# Patient Record
Sex: FEMALE | Race: WHITE | URBAN - METROPOLITAN AREA
[De-identification: names, ages, dates, MRNs, and addresses within clinical notes are randomized per-mention and may not be internally consistent; named-entity substitution may affect disease eponyms.]

---

## 2017-08-03 ENCOUNTER — IMPORTED ENCOUNTER (OUTPATIENT)
Dept: URBAN - METROPOLITAN AREA CLINIC 50 | Facility: CLINIC | Age: 62
End: 2017-08-03

## 2017-12-07 ENCOUNTER — IMPORTED ENCOUNTER (OUTPATIENT)
Dept: URBAN - METROPOLITAN AREA CLINIC 50 | Facility: CLINIC | Age: 62
End: 2017-12-07

## 2017-12-27 ENCOUNTER — IMPORTED ENCOUNTER (OUTPATIENT)
Dept: URBAN - METROPOLITAN AREA CLINIC 50 | Facility: CLINIC | Age: 62
End: 2017-12-27

## 2017-12-27 NOTE — PATIENT DISCUSSION
"""Dr. Paralee Baumgarten to review test results with patient."" ""Reassured patient that intraocular pressures (IOPs) are at target levels and other ocular findings are stable. Continue present management and/or medication(s).  Follow up as directed. """

## 2018-05-16 ENCOUNTER — IMPORTED ENCOUNTER (OUTPATIENT)
Dept: URBAN - METROPOLITAN AREA CLINIC 50 | Facility: CLINIC | Age: 63
End: 2018-05-16

## 2018-12-05 ENCOUNTER — IMPORTED ENCOUNTER (OUTPATIENT)
Dept: URBAN - METROPOLITAN AREA CLINIC 50 | Facility: CLINIC | Age: 63
End: 2018-12-05

## 2018-12-05 NOTE — PATIENT DISCUSSION
"""Dr Yareli Coffey to review results with patient today"" ""Reassured patient that intraocular pressures (IOPs) are at target levels and other ocular findings are stable. Continue present management and/or medication(s).  Follow up as directed. """

## 2018-12-27 ENCOUNTER — IMPORTED ENCOUNTER (OUTPATIENT)
Dept: URBAN - METROPOLITAN AREA CLINIC 50 | Facility: CLINIC | Age: 63
End: 2018-12-27

## 2019-06-05 ENCOUNTER — IMPORTED ENCOUNTER (OUTPATIENT)
Dept: URBAN - METROPOLITAN AREA CLINIC 50 | Facility: CLINIC | Age: 64
End: 2019-06-05

## 2019-10-14 NOTE — PATIENT DISCUSSION
PT TO TRIAL MV CORRECTION: LEANING TOWARDS RLE &gt; LASIK 2' UNDERSTANDS CATARACTS WILL CONTINUE TO CHANGE RX.

## 2019-10-14 NOTE — PATIENT DISCUSSION
CATARACTS, OU- NOT VISUALLY SIGNIFICANT. DISC OPT OF GLS/[id number]-REFRACTIVE SX TO REDUCE DEPENDENCY ON GLS.

## 2019-10-14 NOTE — PATIENT DISCUSSION
DISC ALL SACRIFICES WITH MONOVA, MULTIFOCAL, AND EDOF LENSES IF PT DESIRES RLE SX. IT WAS EXPLAINED TO THE PT THAT CORRECTING FOR DVA OU WOULD REQUIRE PT TO WEAR GLASSES FOR ALL NVA/INT ACTIVITIES AND CORRECTING PT FOR NVA OU WOULD REQUIRE WEARING GLASSES FOR ALL DISTANCE/INT ACTIVITIES.

## 2019-10-21 NOTE — PATIENT DISCUSSION
MV TRIAL UNSUCCESSFUL. INTERESTED IN PERMANENT CORRECTION &gt; LASIK.  THEREFORE, PT TO SEE COUNSELOR TO DISC JOSE LUIS RUBIN.

## 2019-11-04 NOTE — PATIENT DISCUSSION
PANOPTIX IOL DISCUSSED WITH THE PATIENT. THE PATIENT UNDERSTANDS THAT THE COMPROMISES AND VISUAL SYMPTOMS FROM PATIENT TO PATIENT IS VARIABLE. IT IS IMPOSSIBLE TO PREDICT THE OUTCOME OF THE LENS BEING OFFERED. PANOPTIX TRIFOCAL IOL IS DESIGNED TO DECREASE DEPENDENCY ON GLASSES FOR NEAR, INTERMEDIATE AND DISTANCE. COMPROMISES INCLUDE DECREASED QUALITY OF VISION, DEPTH OF FOCUS AND CONTRAST SENSITIVITY. VISUAL DISTURBANCES INCLUDE STARBURSTS, RINGS AND HALOS USUALLY NOTICED AROUND POINT SOURCES OF LIGHT AT NIGHT.

## 2019-11-04 NOTE — PATIENT DISCUSSION
DRY EYE SYNDROME OU - LUBRICATE 4-6 TIMES DAILY WITH REFRESH OR SYSTANE IN ADDITION TO LOTEMAX 2 TIMES A DAY.

## 2019-11-04 NOTE — PATIENT DISCUSSION
REFRACTIVE ERROR OU - THE PATIENT DESIRES A REFRACTIVE LENS EXCHANGE. MONOVISION CONTACT LENS TRIAL WENT WELL ALTHOUGH THE PATIENT PREFERS PANOPTIX TO BE LESS DEPENDENT ON GLASSES.

## 2019-11-13 ENCOUNTER — IMPORTED ENCOUNTER (OUTPATIENT)
Dept: URBAN - METROPOLITAN AREA CLINIC 50 | Facility: CLINIC | Age: 64
End: 2019-11-13

## 2019-11-13 NOTE — PATIENT DISCUSSION
"""Reassured patient that intraocular pressures (IOPs) are at target levels and other ocular findings are stable. Continue present management and/or medication(s).  Follow up as directed. "" ""Continue Lumigan both eyes at bedtime ."""

## 2019-11-20 NOTE — PATIENT DISCUSSION
Continue: Lotemax (loteprednol etabonate): drops,suspension: 0.5% 1 drop three times a day into both eyes 11-

## 2020-05-19 ENCOUNTER — IMPORTED ENCOUNTER (OUTPATIENT)
Dept: URBAN - METROPOLITAN AREA CLINIC 50 | Facility: CLINIC | Age: 65
End: 2020-05-19

## 2020-05-19 PROBLEM — INACTIVE: Status: INACTIVE | Noted: 2020-05-19

## 2020-05-19 PROBLEM — Z98.890: Noted: 2020-05-19

## 2020-05-19 NOTE — PATIENT DISCUSSION
"""excision of lesion ERIS done today with patient consent "" ""Start Erythromycin Ointment left eye twice a day . """

## 2020-05-19 NOTE — PATIENT DISCUSSION
"""Cataract(s) are not visually significant for cataract surgery.  Monitor by regular examinations."""

## 2020-11-10 ENCOUNTER — IMPORTED ENCOUNTER (OUTPATIENT)
Dept: URBAN - METROPOLITAN AREA CLINIC 50 | Facility: CLINIC | Age: 65
End: 2020-11-10

## 2021-03-09 ENCOUNTER — IMPORTED ENCOUNTER (OUTPATIENT)
Dept: URBAN - METROPOLITAN AREA CLINIC 50 | Facility: CLINIC | Age: 66
End: 2021-03-09

## 2021-03-16 ENCOUNTER — IMPORTED ENCOUNTER (OUTPATIENT)
Dept: URBAN - METROPOLITAN AREA CLINIC 50 | Facility: CLINIC | Age: 66
End: 2021-03-16

## 2021-03-18 ENCOUNTER — IMPORTED ENCOUNTER (OUTPATIENT)
Dept: URBAN - METROPOLITAN AREA CLINIC 50 | Facility: CLINIC | Age: 66
End: 2021-03-18

## 2021-03-23 ENCOUNTER — IMPORTED ENCOUNTER (OUTPATIENT)
Dept: URBAN - METROPOLITAN AREA CLINIC 50 | Facility: CLINIC | Age: 66
End: 2021-03-23

## 2021-03-30 ENCOUNTER — IMPORTED ENCOUNTER (OUTPATIENT)
Dept: URBAN - METROPOLITAN AREA CLINIC 50 | Facility: CLINIC | Age: 66
End: 2021-03-30

## 2021-04-13 ENCOUNTER — IMPORTED ENCOUNTER (OUTPATIENT)
Dept: URBAN - METROPOLITAN AREA CLINIC 50 | Facility: CLINIC | Age: 66
End: 2021-04-13

## 2021-04-17 ASSESSMENT — PACHYMETRY
OS_CT_UM: 527
OD_CT_UM: 529
OD_CT_UM: 529
OS_CT_UM: 527
OD_CT_UM: 529
OS_CT_UM: 527
OD_CT_UM: 529
OS_CT_UM: 527
OD_CT_UM: 529
OD_CT_UM: 529
OS_CT_UM: 527
OD_CT_UM: 529
OS_CT_UM: 527
OS_CT_UM: 527
OD_CT_UM: 529
OS_CT_UM: 527
OD_CT_UM: 529
OS_CT_UM: 527
OD_CT_UM: 529
OS_CT_UM: 527

## 2021-04-17 ASSESSMENT — TONOMETRY
OD_IOP_MMHG: 15
OD_IOP_MMHG: 16
OS_IOP_MMHG: 17
OS_IOP_MMHG: 18
OS_IOP_MMHG: 17
OS_IOP_MMHG: 18
OD_IOP_MMHG: 18
OS_IOP_MMHG: 13
OS_IOP_MMHG: 14
OS_IOP_MMHG: 15
OD_IOP_MMHG: 15
OD_IOP_MMHG: 21
OD_IOP_MMHG: 14
OS_IOP_MMHG: 17
OD_IOP_MMHG: 17
OD_IOP_MMHG: 19
OS_IOP_MMHG: 18
OD_IOP_MMHG: 17
OD_IOP_MMHG: 17
OS_IOP_MMHG: 14
OD_IOP_MMHG: 14
OS_IOP_MMHG: 19
OD_IOP_MMHG: 18
OS_IOP_MMHG: 17
OS_IOP_MMHG: 17
OD_IOP_MMHG: 18
OD_IOP_MMHG: 15
OS_IOP_MMHG: 15
OD_IOP_MMHG: 19
OS_IOP_MMHG: 16
OD_IOP_MMHG: 18
OS_IOP_MMHG: 20
OS_IOP_MMHG: 23
OD_IOP_MMHG: 17
OS_IOP_MMHG: 14
OS_IOP_MMHG: 21
OS_IOP_MMHG: 18
OS_IOP_MMHG: 16
OS_IOP_MMHG: 19
OD_IOP_MMHG: 17
OS_IOP_MMHG: 20
OD_IOP_MMHG: 16
OS_IOP_MMHG: 22
OS_IOP_MMHG: 18
OD_IOP_MMHG: 16
OD_IOP_MMHG: 22
OD_IOP_MMHG: 18
OS_IOP_MMHG: 21
OS_IOP_MMHG: 17
OD_IOP_MMHG: 14
OD_IOP_MMHG: 16
OD_IOP_MMHG: 17

## 2021-04-17 ASSESSMENT — VISUAL ACUITY
OS_SC: 20/20
OS_CC: 20/25-2
OD_CC: 20/30+2
OS_CC: J1+
OS_CC: 20/25-2
OS_CC: 20/25-2
OD_PH: 20/30-1
OS_BAT: 20/30
OS_SC: 20/20
OD_SC: 20/40
OS_CC: J1@ 16 IN
OD_CC: 20/50
OD_PH: 20/25
OS_CC: 20/25+
OD_CC: J1
OD_SC: 20/20
OD_CC: 20/20
OD_SC: 20/30
OD_SC: 20/20 BLURRY
OS_OTHER: 20/30. 20/40.
OD_CC: 20/30+
OD_CC: 20/25-2
OS_PH: 20/25
OS_SC: 20/20
OS_CC: J1+
OS_CC: 20/20
OD_CC: J1@ 16 IN
OS_SC: 20/25-1
OD_OTHER: 20/20.
OD_CC: 20/30
OD_CC: J1+
OS_CC: 20/25
OS_SC: 20/20
OS_CC: 20/40
OD_CC: 20/40-1
OD_CC: J1+
OS_CC: J1
OD_SC: 20/25
OD_SC: 20/25+2
OS_SC: 20/20

## 2021-06-28 ENCOUNTER — PREPPED CHART (OUTPATIENT)
Dept: URBAN - METROPOLITAN AREA CLINIC 52 | Facility: CLINIC | Age: 66
End: 2021-06-28

## 2021-06-29 ENCOUNTER — PROBLEM (OUTPATIENT)
Dept: URBAN - METROPOLITAN AREA CLINIC 53 | Facility: CLINIC | Age: 66
End: 2021-06-29

## 2021-06-29 DIAGNOSIS — H01.005: ICD-10-CM

## 2021-06-29 DIAGNOSIS — H01.002: ICD-10-CM

## 2021-06-29 PROCEDURE — 92012 INTRM OPH EXAM EST PATIENT: CPT

## 2021-06-29 PROCEDURE — LIDDB LID DEBRIDEMENT

## 2021-06-29 RX ORDER — LOTEPREDNOL ETABONATE 2 MG/ML: 1 SUSPENSION/ DROPS OPHTHALMIC ONCE A DAY

## 2021-06-29 ASSESSMENT — TONOMETRY
OS_IOP_MMHG: 15
OD_IOP_MMHG: 14
OD_IOP_MMHG: 15
OS_IOP_MMHG: 14

## 2021-06-29 ASSESSMENT — VISUAL ACUITY
OS_SC: 20/20
OD_SC: 20/30

## 2021-06-29 NOTE — PATIENT DISCUSSION
Blepharitis Bilateral LL. Patient has plaque like substance that  staining on lower lids. Advised patient to begin warm compresses (recommend Oasis), artificial tears (recommend PF), OcuSoft plus lid scrubs, and discontinue all waterproof mascara. Provided patient with samples of Thera Tears PF and Ocusoft Plus Lid scrubs. Advised patient to do glaucoma drops first, then cleans lids, warm compresses, and last artificial tears. Gave sample of Alrex for patient to use on her very bad days. One drop in each eye. Performed lid debridement on lower lids in office today.  Consent signed in chart. If patient has not improved within the month patient can call to schedule a follow up. Will continue to monitor. Patient wanted lid debridement billed out to insurance. If it comes back it is okay to no charge the lid debridement procedure. BLC.

## 2021-06-29 NOTE — PROCEDURE NOTE: CLINICAL
PROCEDURE NOTE: Lid Margin Debridement Bilateral Lower Lids. Diagnosis: Unspecified Blepharitis. Risks, benefits and alternatives were discussed with the patient prior to the procedure and included inflammation, bleeding, and the possibility of needing additional procedures. The patient requested that we proceed with the procedure. There were no complications. Mildred Chiu

## 2021-10-12 ENCOUNTER — FOLLOW UP (OUTPATIENT)
Dept: URBAN - METROPOLITAN AREA CLINIC 53 | Facility: CLINIC | Age: 66
End: 2021-10-12

## 2021-10-12 DIAGNOSIS — H40.1131: ICD-10-CM

## 2021-10-12 PROCEDURE — 92020 GONIOSCOPY: CPT

## 2021-10-12 PROCEDURE — 92012 INTRM OPH EXAM EST PATIENT: CPT

## 2021-10-12 ASSESSMENT — VISUAL ACUITY
OD_SC: 20/25+2
OS_SC: 20/20-1

## 2021-10-12 ASSESSMENT — TONOMETRY
OD_IOP_MMHG: 16
OS_IOP_MMHG: 17
OS_IOP_MMHG: 16
OD_IOP_MMHG: 17

## 2021-10-12 NOTE — PATIENT DISCUSSION
iop in range,  patient is continuously having irritation, very mild swelling and redness.  Possible allergic reaction to brimonidine.  offer patient stop brimonidine and start drozolomide/timolol. Patient does not want to switch right now.  recommend patient return in 3 months for iop check and to see if she is still having any trouble.  will do dilated exam at that time.

## 2022-01-04 ENCOUNTER — EMERGENCY VISIT (OUTPATIENT)
Dept: URBAN - METROPOLITAN AREA CLINIC 53 | Facility: CLINIC | Age: 67
End: 2022-01-04

## 2022-01-04 DIAGNOSIS — H40.1131: ICD-10-CM

## 2022-01-04 DIAGNOSIS — H11.31: ICD-10-CM

## 2022-01-04 DIAGNOSIS — H10.12: ICD-10-CM

## 2022-01-04 PROCEDURE — 92012 INTRM OPH EXAM EST PATIENT: CPT

## 2022-01-04 RX ORDER — TOBRAMYCIN AND DEXAMETHASONE 1; 3 MG/ML; MG/ML
1 SUSPENSION/ DROPS OPHTHALMIC
Start: 2022-01-04 | End: 2022-01-08

## 2022-01-04 ASSESSMENT — VISUAL ACUITY
OD_SC: 20/20
OS_SC: 20/20

## 2022-01-04 ASSESSMENT — TONOMETRY
OD_IOP_MMHG: 13
OS_IOP_MMHG: 13
OD_IOP_MMHG: 14
OS_IOP_MMHG: 14

## 2022-01-04 NOTE — PATIENT DISCUSSION
Reassured Pt and explained condition. Advised may take several weeks to reabsorb completely. Advised may look work before resolving.

## 2022-01-11 ENCOUNTER — ESTABLISHED PATIENT (OUTPATIENT)
Dept: URBAN - METROPOLITAN AREA CLINIC 53 | Facility: CLINIC | Age: 67
End: 2022-01-11

## 2022-01-11 DIAGNOSIS — H11.31: ICD-10-CM

## 2022-01-11 DIAGNOSIS — E11.9: ICD-10-CM

## 2022-01-11 DIAGNOSIS — H10.12: ICD-10-CM

## 2022-01-11 DIAGNOSIS — H40.1131: ICD-10-CM

## 2022-01-11 PROCEDURE — 92014 COMPRE OPH EXAM EST PT 1/>: CPT

## 2022-01-11 ASSESSMENT — VISUAL ACUITY
OS_SC: 20/20
OD_SC: 20/20

## 2022-01-11 ASSESSMENT — TONOMETRY
OD_IOP_MMHG: 14
OS_IOP_MMHG: 14
OD_IOP_MMHG: 13
OS_IOP_MMHG: 13

## 2022-01-11 NOTE — PATIENT DISCUSSION
iop in range,  patient is continuously having irritation, very mild swelling and redness.  Possible allergic reaction to brimonidine.  offered patient stop brimonidine and start drozolomide/timolol at prior visit. Patient does not want to switch right now.  Recommend patient continue timolol 0.5% twice a day in both eyes and brimonidine 0.2% teice a day in both eyes.  will recheck iop in 6 months with hvf/oct rnfl.

## 2022-06-21 NOTE — PATIENT DISCUSSION
11/10/2022      [facility]   [address] [facility] [address]    Dear Ms. Wolff,    Your procedure is scheduled for December 6, 2022 with Dr. Gregorio Winston at:    [facility]  [address] [place]   [address]  [phone]  Please enter the main entrance and take the elevators to the 3rd floor.  Check in at Same Day Surgery desk.    You can expect to be contacted by Zachariah on 12/2/22 to confirm arrival and surgery time.      The following appointment(s) have been scheduled for you:         Pre-Procedure / Pre-Surgery COVID-19 Testing:  We have you scheduled for your COVID-19 Testing Visit on: SUNDAY December 4, 2022 at 11 am for our ACL Lab site located at [facility]  ([address] [facility]  [address] ... 2nd Floor Test Center).    If you need to reschedule this appointment, please call our office ASAP for assistance.    Please remember the following instructions for after your test and before surgery:  Self-quarantine is recommended and minimizes your risk of exposure before your procedure. If you are unable to self-quarantine, please continue to wear a mask, practice social distancing and perform good hand hygiene. After your COVID-19 test and before your procedure, please continue to monitor for signs/symptoms of COVID-19 and notify your surgeon's office ASAP if you do experience any symptoms.    · Post-op with Dr. Winston at the [facility], Medical Office [address], [address] ([address] [place] [address]) on December 21, 2022 at 10 am.                        To better prepare for your surgery, please follow these instructions:    • Please schedule an appointment with your Primary Care Physician for a PreOperative History and Physical for 2-3 weeks before your surgery date.   This is a pre-operative requirement for medical clearance for surgery/anesthesia. Your primary care physician will perform a  """Mild Primary Open Angle Glaucoma history and physical and order lab work to review, ensuring there is not any other medical concern that would prevent safely proceeding with surgery.  We will send them the information about your planned procedure and what testing we are looking for (and where to send it to).  If you haven't already done so, please call them ASAP to schedule an appointment. Please call Zachariah surgery scheduler at [phone], when your appointment has been scheduled with your primary care physician. It is OK to leave a voicemail with this information (date, time, and name of your doctor).    • Starting 10 days prior to your surgery, please do not take any Phentermine or other diet/weight loss products.  Continuing these medications in the 10 days before surgery will likely result in postponement due to anesthesia requirements.  Please consult with your prescribing physicians if you have any questions.     • Starting 5 days prior to your surgery, please do not take any aspirin products, anti-inflammatory medication or blood thinners.  This includes products such as Christianne-[place], Pepto Bismol, Motrin, Ibuprofen and Advil should also be avoided.  (Tylenol products are aspirin free, so Tylenol products are OK to take for pain.).      • If you take any other prescription medication, including blood thinners such as coumadin or Plavix, please contact your ordering or primary care physician ASAP, so that you can inform them about your upcoming surgery and so that they can decide with you if any changes to your medication schedule are necessary.  If approved by your physician, you may take blood pressure and heart medications the morning of the procedure with a small amount of water.                 • Do not have anything to eat or drink starting at midnight the night before your surgery.     • Be sure you use your HibiClens!    It is a topical antiseptic, antimicrobial skin cleanser; Hibiclens gently and effectively cleanses skin and  superficial wounds and can protect the skin for up to 24 hours. It's gentle on patient skin and as simple and easy to use as any liquid soap.    • For your safety, must have a ride home after surgery, due to both anesthesia and post-op pain medication.    This must be with someone who can take responsibility for you and assist with your discharge from the surgery center, not a cab, bus, etc.    You will need someone available to remain with you up to 24 hours after you have been discharged.                • Please remember that all times are subject to change as the hospital coordinates the schedule to meet the needs of your surgery and the overall flow of the OR that day.  You will be called ASAP to advise you of any changes to your surgery time or the time you need to arrive for your surgery.      You will receive an invitation via email from [employer] to view [employer].    This informative web-based education program will give you helpful information pertaining to your upcoming surgery. If you do not have an active email, you may contact [employer] at [phone] to obtain an access code.     You may also receive an invitation from us to your mobile number, email address, or a phone call from our office to complete a short 5 min questionnaire regarding the impact your diagnosis has on your current abilities and activities. We partner with a company called [employer] to gather this information.  Your surgeon is hoping to use this information to naina and monitor your progress starting before your surgery and in the weeks/months during your recovery after your procedure.  In addition, your Insurance Company might also require this information in order to complete the PreAuthorization process for your upcoming surgery.  Without it, they will not complete the process for Approval.  Thank you for your participation!    If you have any work related and/or disability forms that need to be completed, please contact the Forms Completion  Department at [phone]. Forms can be dropped off at any of our [facility] locations. Please be advised that it can take 7 to 14 business days to complete these requests.    If you have questions regarding the procedure, medications, rehab, etc., please contact the nursing staff at [phone]    If you have any scheduling questions or need to reschedule, please contact me at the telephone number and extension listed below.     Thank you,    Zachariah at [phone]  Surgery Scheduler for Dr. Gregorio Winston  [facility]                                  Insurance Authorization Need to Know’s    Prior to your surgical procedure, our team will contact your Insurance Company to initiate a PreAuthorization request.      This is not a guarantee of payment from your insurance company, but rather a step taken to ensure that we have all of the information and documentation for them to confirm the procedure is one that is eligible for coverage under your plan.    We will contact you if we either need more information from you to fulfill the requirements of your insurance company, or if we need to discuss any concerns that may lead to postponement or cancellation of your procedure. If you have any questions regarding your surgery authorization, please check with your insurance company or call our office for an update.    If you need information regarding your level of benefits or out-of-pocket expenses, please contact your insurance company directly.  They can also confirm for you whether or not we (the surgeon and the hospital/surgery center) are in your plan’s preferred network (aka ‘in-network’).    What to do if… My Insurance Changes:  If, at any time, your insurance company, plan or even card changes… Please call our office so that our team can be sure to update your records.  We will need to make sure to submit any PreAuth or ernesto to the correct, up-to-date insurance plan.      What to do if…  My Insurance Requires A Referral:  If your insurance company requires a Referral for Specialty Care or to see a Specialist, you will need to confirm with them if you have one on file.    - If your insurance carrier does not have a referral, then you will need to contact your Primary Care Physician to have one directly submitted to your insurance company ASAP.    - Without a referral on file, your insurance company will not Pre-Authorize your surgery and may not cover any of your care with our specialty.    What to do if… I have a Workers Compensation (W/C) Claim:  If you have a W/C claim, please be sure to provide our reception team with the information you have regarding your claim ASAP.  We will contact your W/C carrier/adjustor to inform them of your upcoming surgery and check the status of your claim (open vs closed).  We will let you know if they advise of any concerns or issues with your claim.  - Even if you have an open W/C claim, please also provide us with your personal/family insurance.  We will want to be sure this plan is loaded into your account.  We always PreAuthorize with personal insurance as a back-up to W/C.  Otherwise, if W/C doesn’t cover something along the way, you will receive a bill for the services.    What to do if… I have Month-to-Month Coverage/Premiums:  If you have an insurance plan that is paid for month to month, or is subject to plan change on a monthly basis, please be aware we cannot initiate PreAuthorization until just before the month of your surgery, as your insurance company will need to verify your premium payments/eligibility first.    What to do if… I Do Not Have Insurance Coverage or Have other Insurance/Billing Questions:  Please call our Patient Contact Center:  [phone] to speak with a team member about your billing needs, including possible coverage options, setting up payment plans, our fee schedule, etc.

## 2022-07-12 ENCOUNTER — FOLLOW UP (OUTPATIENT)
Dept: URBAN - METROPOLITAN AREA CLINIC 53 | Facility: CLINIC | Age: 67
End: 2022-07-12

## 2022-07-12 DIAGNOSIS — H40.1131: ICD-10-CM

## 2022-07-12 DIAGNOSIS — H25.13: ICD-10-CM

## 2022-07-12 DIAGNOSIS — H43.813: ICD-10-CM

## 2022-07-12 DIAGNOSIS — H01.005: ICD-10-CM

## 2022-07-12 DIAGNOSIS — H01.002: ICD-10-CM

## 2022-07-12 DIAGNOSIS — E11.9: ICD-10-CM

## 2022-07-12 PROCEDURE — 92083 EXTENDED VISUAL FIELD XM: CPT

## 2022-07-12 PROCEDURE — 92133 CPTRZD OPH DX IMG PST SGM ON: CPT

## 2022-07-12 PROCEDURE — 92012 INTRM OPH EXAM EST PATIENT: CPT

## 2022-07-12 ASSESSMENT — VISUAL ACUITY
OS_SC: 20/25+2
OD_SC: 20/30-1
OD_PH: 20/25

## 2022-07-12 ASSESSMENT — TONOMETRY
OS_IOP_MMHG: 16
OS_IOP_MMHG: 17
OD_IOP_MMHG: 17
OD_IOP_MMHG: 18

## 2023-01-24 ENCOUNTER — FOLLOW UP (OUTPATIENT)
Dept: URBAN - METROPOLITAN AREA CLINIC 53 | Facility: CLINIC | Age: 68
End: 2023-01-24

## 2023-01-24 DIAGNOSIS — H40.1131: ICD-10-CM

## 2023-01-24 PROCEDURE — 92012 INTRM OPH EXAM EST PATIENT: CPT

## 2023-01-24 ASSESSMENT — TONOMETRY
OS_IOP_MMHG: 15
OS_IOP_MMHG: 14
OD_IOP_MMHG: 14
OD_IOP_MMHG: 13

## 2023-01-24 ASSESSMENT — VISUAL ACUITY
OD_CC: 20/30
OS_CC: 20/25+2

## 2023-01-24 NOTE — PATIENT DISCUSSION
Patient has been doing well with the Brimonidine drops since the last visit. The IOP is in the target range OU. Continue current Brimonidine and Timolol drop therapy. Schedule HVF/RNFL OCT prior to next visit.

## 2023-06-13 ENCOUNTER — DIAGNOSTICS ONLY (OUTPATIENT)
Dept: URBAN - METROPOLITAN AREA CLINIC 53 | Facility: CLINIC | Age: 68
End: 2023-06-13

## 2023-06-13 DIAGNOSIS — H40.1131: ICD-10-CM

## 2023-06-13 PROCEDURE — 92083 EXTENDED VISUAL FIELD XM: CPT

## 2023-06-13 PROCEDURE — 92133 CPTRZD OPH DX IMG PST SGM ON: CPT

## 2023-07-18 ENCOUNTER — COMPREHENSIVE EXAM (OUTPATIENT)
Dept: URBAN - METROPOLITAN AREA CLINIC 53 | Facility: CLINIC | Age: 68
End: 2023-07-18

## 2023-07-18 DIAGNOSIS — H25.13: ICD-10-CM

## 2023-07-18 DIAGNOSIS — E11.9: ICD-10-CM

## 2023-07-18 DIAGNOSIS — H43.813: ICD-10-CM

## 2023-07-18 DIAGNOSIS — H40.1131: ICD-10-CM

## 2023-07-18 DIAGNOSIS — H35.371: ICD-10-CM

## 2023-07-18 PROCEDURE — 92014 COMPRE OPH EXAM EST PT 1/>: CPT

## 2023-07-18 PROCEDURE — 92015 DETERMINE REFRACTIVE STATE: CPT

## 2023-07-18 PROCEDURE — 92134 CPTRZ OPH DX IMG PST SGM RTA: CPT

## 2023-07-18 RX ORDER — OLOPATADINE HYDROCHLORIDE 2 MG/ML
1 SOLUTION OPHTHALMIC EVERY MORNING
Start: 2023-07-18

## 2023-07-18 ASSESSMENT — VISUAL ACUITY
OS_SC: 20/25
OD_GLARE: 20/50
OU_CC: J1+@16"
OS_GLARE: 20/20-1
OS_GLARE: 20/20
OD_SC: 20/30-1
OD_GLARE: 20/40-1

## 2023-07-18 ASSESSMENT — TONOMETRY
OD_IOP_MMHG: 15
OS_IOP_MMHG: 15
OD_IOP_MMHG: 14
OS_IOP_MMHG: 16

## 2023-10-30 ENCOUNTER — ESTABLISHED PATIENT (OUTPATIENT)
Dept: URBAN - METROPOLITAN AREA CLINIC 52 | Facility: CLINIC | Age: 68
End: 2023-10-30

## 2023-10-30 DIAGNOSIS — H10.13: ICD-10-CM

## 2023-10-30 DIAGNOSIS — H40.1131: ICD-10-CM

## 2023-10-30 PROCEDURE — 99213 OFFICE O/P EST LOW 20 MIN: CPT

## 2023-10-30 RX ORDER — PREDNISOLONE ACETATE 10 MG/ML
1 SUSPENSION/ DROPS OPHTHALMIC TWICE A DAY
Start: 2023-10-30

## 2023-10-30 ASSESSMENT — VISUAL ACUITY
OS_SC: 20/20-1
OD_SC: 20/30

## 2023-10-30 ASSESSMENT — TONOMETRY
OS_IOP_MMHG: 16
OD_IOP_MMHG: 17
OD_IOP_MMHG: 18
OS_IOP_MMHG: 17

## 2023-11-14 ENCOUNTER — FOLLOW UP (OUTPATIENT)
Dept: URBAN - METROPOLITAN AREA CLINIC 53 | Facility: CLINIC | Age: 68
End: 2023-11-14

## 2023-11-14 DIAGNOSIS — H04.123: ICD-10-CM

## 2023-11-14 DIAGNOSIS — H25.13: ICD-10-CM

## 2023-11-14 DIAGNOSIS — H02.885: ICD-10-CM

## 2023-11-14 DIAGNOSIS — H10.13: ICD-10-CM

## 2023-11-14 DIAGNOSIS — H40.1131: ICD-10-CM

## 2023-11-14 DIAGNOSIS — H02.882: ICD-10-CM

## 2023-11-14 PROCEDURE — 99213 OFFICE O/P EST LOW 20 MIN: CPT

## 2023-11-14 RX ORDER — POVIDONE 2.5 MG/.5G
SOLUTION, GEL FORMING / DROPS OPHTHALMIC TWICE A DAY
Start: 2023-11-14

## 2023-11-14 ASSESSMENT — VISUAL ACUITY
OD_SC: 20/25-2
OS_SC: 20/20

## 2023-11-14 ASSESSMENT — TONOMETRY
OD_IOP_MMHG: 18
OD_IOP_MMHG: 17
OS_IOP_MMHG: 18
OS_IOP_MMHG: 17

## 2024-01-23 ENCOUNTER — FOLLOW UP (OUTPATIENT)
Dept: URBAN - METROPOLITAN AREA CLINIC 53 | Facility: CLINIC | Age: 69
End: 2024-01-23

## 2024-01-23 DIAGNOSIS — H02.885: ICD-10-CM

## 2024-01-23 DIAGNOSIS — H02.882: ICD-10-CM

## 2024-01-23 DIAGNOSIS — H40.1131: ICD-10-CM

## 2024-01-23 DIAGNOSIS — H04.123: ICD-10-CM

## 2024-01-23 PROCEDURE — 99213 OFFICE O/P EST LOW 20 MIN: CPT

## 2024-01-23 RX ORDER — ERYTHROMYCIN 5 MG/G
OINTMENT OPHTHALMIC EVERY EVENING
Start: 2024-01-23 | End: 2024-01-30

## 2024-01-23 ASSESSMENT — VISUAL ACUITY
OS_CC: 20/25
OD_CC: 20/30

## 2024-01-23 ASSESSMENT — TONOMETRY
OS_IOP_MMHG: 16
OD_IOP_MMHG: 15
OS_IOP_MMHG: 17
OD_IOP_MMHG: 14

## 2024-03-26 NOTE — PATIENT DISCUSSION
Pt playing mini golf today, had episode of palpitations prior to a witnessed syncopal episode. + head injury/LOC   The risks, benefits and alternatives of RLE surgery have been  discussed to include but are not limited to: decrease in vision, loss of eye, infection, dry eye, bleeding, retinal detachment, need for more surgery.

## 2024-06-04 ENCOUNTER — DIAGNOSTICS ONLY (OUTPATIENT)
Dept: URBAN - METROPOLITAN AREA CLINIC 53 | Facility: CLINIC | Age: 69
End: 2024-06-04

## 2024-06-04 DIAGNOSIS — H40.1131: ICD-10-CM

## 2024-06-04 PROCEDURE — 92133 CPTRZD OPH DX IMG PST SGM ON: CPT

## 2024-06-04 PROCEDURE — 92083 EXTENDED VISUAL FIELD XM: CPT

## 2024-07-23 ENCOUNTER — COMPREHENSIVE EXAM (OUTPATIENT)
Dept: URBAN - METROPOLITAN AREA CLINIC 53 | Facility: CLINIC | Age: 69
End: 2024-07-23

## 2024-07-23 DIAGNOSIS — H25.13: ICD-10-CM

## 2024-07-23 DIAGNOSIS — E11.9: ICD-10-CM

## 2024-07-23 DIAGNOSIS — H40.1131: ICD-10-CM

## 2024-07-23 DIAGNOSIS — H02.882: ICD-10-CM

## 2024-07-23 PROCEDURE — 99214 OFFICE O/P EST MOD 30 MIN: CPT

## 2024-07-23 ASSESSMENT — TONOMETRY
OD_IOP_MMHG: 15
OD_IOP_MMHG: 14
OS_IOP_MMHG: 16
OS_IOP_MMHG: 15

## 2024-07-23 ASSESSMENT — VISUAL ACUITY
OS_GLARE: 20/30
OD_GLARE: 20/30
OU_CC: J1+/-
OS_SC: 20/25
OD_GLARE: 20/40
OS_GLARE: 20/30
OD_SC: 20/30-2

## 2024-07-25 ENCOUNTER — CONSULTATION/EVALUATION (OUTPATIENT)
Dept: URBAN - METROPOLITAN AREA CLINIC 53 | Facility: CLINIC | Age: 69
End: 2024-07-25

## 2024-07-25 DIAGNOSIS — H04.201: ICD-10-CM

## 2024-07-25 PROCEDURE — 99212 OFFICE O/P EST SF 10 MIN: CPT | Mod: 25

## 2024-07-25 PROCEDURE — 68801 DILATE TEAR DUCT OPENING: CPT

## 2024-07-25 ASSESSMENT — TONOMETRY
OD_IOP_MMHG: 16
OS_IOP_MMHG: 16
OD_IOP_MMHG: 15
OS_IOP_MMHG: 15

## 2024-07-25 ASSESSMENT — VISUAL ACUITY
OD_SC: 20/25-2
OS_SC: 20/20

## 2025-01-21 ENCOUNTER — FOLLOW UP (OUTPATIENT)
Age: 70
End: 2025-01-21

## 2025-01-21 DIAGNOSIS — H04.123: ICD-10-CM

## 2025-01-21 DIAGNOSIS — H02.882: ICD-10-CM

## 2025-01-21 DIAGNOSIS — H04.201: ICD-10-CM

## 2025-01-21 DIAGNOSIS — H40.1131: ICD-10-CM

## 2025-01-21 DIAGNOSIS — H02.885: ICD-10-CM

## 2025-01-21 PROCEDURE — 99213 OFFICE O/P EST LOW 20 MIN: CPT

## 2025-08-12 ENCOUNTER — COMPREHENSIVE EXAM (OUTPATIENT)
Age: 70
End: 2025-08-12

## 2025-08-12 DIAGNOSIS — H04.201: ICD-10-CM

## 2025-08-12 DIAGNOSIS — H02.882: ICD-10-CM

## 2025-08-12 DIAGNOSIS — H43.813: ICD-10-CM

## 2025-08-12 DIAGNOSIS — H02.885: ICD-10-CM

## 2025-08-12 DIAGNOSIS — H35.371: ICD-10-CM

## 2025-08-12 DIAGNOSIS — H04.123: ICD-10-CM

## 2025-08-12 DIAGNOSIS — H40.1131: ICD-10-CM

## 2025-08-12 DIAGNOSIS — H25.13: ICD-10-CM

## 2025-08-12 DIAGNOSIS — E11.9: ICD-10-CM

## 2025-08-12 PROCEDURE — 99214 OFFICE O/P EST MOD 30 MIN: CPT

## 2025-08-12 PROCEDURE — 92133 CPTRZD OPH DX IMG PST SGM ON: CPT

## 2025-08-12 PROCEDURE — 92083 EXTENDED VISUAL FIELD XM: CPT
